# Patient Record
Sex: MALE | Race: WHITE | NOT HISPANIC OR LATINO
[De-identification: names, ages, dates, MRNs, and addresses within clinical notes are randomized per-mention and may not be internally consistent; named-entity substitution may affect disease eponyms.]

---

## 2018-01-17 PROBLEM — Z00.00 ENCOUNTER FOR PREVENTIVE HEALTH EXAMINATION: Status: ACTIVE | Noted: 2018-01-17

## 2018-02-01 ENCOUNTER — APPOINTMENT (OUTPATIENT)
Dept: NEUROLOGY | Facility: CLINIC | Age: 33
End: 2018-02-01
Payer: COMMERCIAL

## 2018-02-01 VITALS
BODY MASS INDEX: 32.7 KG/M2 | OXYGEN SATURATION: 98 % | WEIGHT: 263 LBS | SYSTOLIC BLOOD PRESSURE: 142 MMHG | HEIGHT: 75 IN | TEMPERATURE: 97.7 F | DIASTOLIC BLOOD PRESSURE: 93 MMHG | HEART RATE: 78 BPM

## 2018-02-01 DIAGNOSIS — Z82.3 FAMILY HISTORY OF STROKE: ICD-10-CM

## 2018-02-01 DIAGNOSIS — Z78.9 OTHER SPECIFIED HEALTH STATUS: ICD-10-CM

## 2018-02-01 PROCEDURE — 99204 OFFICE O/P NEW MOD 45 MIN: CPT

## 2018-04-06 ENCOUNTER — APPOINTMENT (OUTPATIENT)
Dept: NEUROLOGY | Facility: CLINIC | Age: 33
End: 2018-04-06
Payer: COMMERCIAL

## 2018-04-06 VITALS
OXYGEN SATURATION: 99 % | DIASTOLIC BLOOD PRESSURE: 81 MMHG | HEIGHT: 75 IN | BODY MASS INDEX: 32.33 KG/M2 | WEIGHT: 260 LBS | TEMPERATURE: 98.5 F | SYSTOLIC BLOOD PRESSURE: 151 MMHG | HEART RATE: 75 BPM

## 2018-04-06 PROCEDURE — 99213 OFFICE O/P EST LOW 20 MIN: CPT

## 2018-05-03 ENCOUNTER — RX RENEWAL (OUTPATIENT)
Age: 33
End: 2018-05-03

## 2018-06-06 ENCOUNTER — RX RENEWAL (OUTPATIENT)
Age: 33
End: 2018-06-06

## 2018-07-09 ENCOUNTER — APPOINTMENT (OUTPATIENT)
Dept: NEUROLOGY | Facility: CLINIC | Age: 33
End: 2018-07-09
Payer: COMMERCIAL

## 2018-07-09 VITALS
TEMPERATURE: 98.3 F | WEIGHT: 260 LBS | HEART RATE: 70 BPM | OXYGEN SATURATION: 97 % | BODY MASS INDEX: 32.33 KG/M2 | DIASTOLIC BLOOD PRESSURE: 83 MMHG | SYSTOLIC BLOOD PRESSURE: 142 MMHG | HEIGHT: 75 IN

## 2018-07-09 PROCEDURE — 99213 OFFICE O/P EST LOW 20 MIN: CPT

## 2018-08-08 ENCOUNTER — OTHER (OUTPATIENT)
Age: 33
End: 2018-08-08

## 2018-09-10 ENCOUNTER — RX RENEWAL (OUTPATIENT)
Age: 33
End: 2018-09-10

## 2018-11-09 ENCOUNTER — RX RENEWAL (OUTPATIENT)
Age: 33
End: 2018-11-09

## 2019-01-08 ENCOUNTER — RX RENEWAL (OUTPATIENT)
Age: 34
End: 2019-01-08

## 2019-01-14 ENCOUNTER — APPOINTMENT (OUTPATIENT)
Dept: NEUROLOGY | Facility: CLINIC | Age: 34
End: 2019-01-14

## 2019-02-11 ENCOUNTER — RX RENEWAL (OUTPATIENT)
Age: 34
End: 2019-02-11

## 2019-03-12 ENCOUNTER — RX RENEWAL (OUTPATIENT)
Age: 34
End: 2019-03-12

## 2019-03-25 ENCOUNTER — APPOINTMENT (OUTPATIENT)
Dept: NEUROLOGY | Facility: CLINIC | Age: 34
End: 2019-03-25

## 2019-04-11 ENCOUNTER — RX RENEWAL (OUTPATIENT)
Age: 34
End: 2019-04-11

## 2019-06-17 ENCOUNTER — RX RENEWAL (OUTPATIENT)
Age: 34
End: 2019-06-17

## 2019-06-19 ENCOUNTER — RX RENEWAL (OUTPATIENT)
Age: 34
End: 2019-06-19

## 2020-04-08 ENCOUNTER — APPOINTMENT (OUTPATIENT)
Dept: NEUROLOGY | Facility: CLINIC | Age: 35
End: 2020-04-08

## 2020-04-08 ENCOUNTER — APPOINTMENT (OUTPATIENT)
Dept: NEUROLOGY | Facility: CLINIC | Age: 35
End: 2020-04-08
Payer: COMMERCIAL

## 2020-04-08 PROCEDURE — 99213 OFFICE O/P EST LOW 20 MIN: CPT

## 2020-04-08 NOTE — HISTORY OF PRESENT ILLNESS
[Home] : at home, [unfilled] , at the time of the visit. [Other Location: e.g. Home (Enter Location, City,State)___] : at [unfilled] [Patient] : the patient [Self] : self [FreeTextEntry1] : S: \par \par lost health insurance, has not seen Dr. Mckeon since 2018.\par \par has been off adderal since mid 2019. \par \par reports occ poor motivation. worse during covid crisis.\par \par poor motivation\par trouble focusing\par takes longer with tasks\par anxiety\par \par ROS:\par 10 point ROS otherwise negative or as per above\par \par Meds:\par none currently\par \par O:\par normal affect\par alert\par oriented\par normally conversant\par moves all 4 well\par \par AP: adhd, worse off meds\par \par - adderal 25mg/day.\par - f/u with Dr. Mckeon.

## 2020-09-28 ENCOUNTER — APPOINTMENT (OUTPATIENT)
Dept: NEUROLOGY | Facility: CLINIC | Age: 35
End: 2020-09-28
Payer: COMMERCIAL

## 2020-09-28 PROCEDURE — 99213 OFFICE O/P EST LOW 20 MIN: CPT | Mod: 95

## 2020-10-06 NOTE — HISTORY OF PRESENT ILLNESS
[Home] : at home, [unfilled] , at the time of the visit. [Medical Office: ([facility])___] : at the medical office located in  [Verbal consent obtained from patient] : the patient, [unfilled] [FreeTextEntry1] : Rajinder returns for check-in for his diagnosis of ADHD.\par \mercy Did well with the adderall 30mg/d, and nothing on the weekends.  He does tend to take more when working much more late in the evening.\par This has been particularly helpful during work-from home during covid-19.\par \par Series 3 he passed this time (just failed the first time).\par \mercy Has had constipation, rather than diarrhea.  It's been coming and going.\par \par Rare insomnia, taking too much adderall can affect him negatively.\par No h/a, nausea, tremor or irritability.\par \par Anxiety seems to be better on adderall, especially given the worsening workloads.\par Depression is not an issue, he has been able to get over an engagement that was called off abuptly, and is able to go forward.

## 2020-10-06 NOTE — DISCUSSION/SUMMARY
[FreeTextEntry1] : Rajinder is a 36yo LHM with a prior diagnosis of ADHD\par 1) ADD: performance at work, particularly when forced to work from home during covid peak, has been solid.\par 2) anxiety improved on adderall.\par 3) no negative/adverse effects:  no mood issues, no tremor, H/A or nausea.  Slight decrease in appetite initially but returned.  Has lost 15 lbs, but on purpose, running daily.\par \par Plan:\par 1) continue adderall 30mg/day\par 2) RTC in 6 months\par

## 2020-10-06 NOTE — PHYSICAL EXAM
[FreeTextEntry1] : General:\par Constitutional:  Sitting comfortably in NAD.\par Psychiatric: well-groomed, appropriate affect, insight/judgment intact\par \par Cognitive:\par Orientation, language, memory and knowledge screens intact.\par No expressive or receptive errors.\par \par Cranial Nerves:\par VII: Face appears symmetric \par \par Motor:\par Power: no pronator drift.\par

## 2021-05-13 ENCOUNTER — TRANSCRIPTION ENCOUNTER (OUTPATIENT)
Age: 36
End: 2021-05-13

## 2021-06-03 ENCOUNTER — APPOINTMENT (OUTPATIENT)
Dept: NEUROLOGY | Facility: CLINIC | Age: 36
End: 2021-06-03
Payer: COMMERCIAL

## 2021-06-03 VITALS
RESPIRATION RATE: 16 BRPM | DIASTOLIC BLOOD PRESSURE: 95 MMHG | TEMPERATURE: 97.7 F | SYSTOLIC BLOOD PRESSURE: 144 MMHG | BODY MASS INDEX: 31.83 KG/M2 | OXYGEN SATURATION: 98 % | HEIGHT: 75 IN | HEART RATE: 87 BPM | WEIGHT: 256 LBS

## 2021-06-03 PROCEDURE — 99072 ADDL SUPL MATRL&STAF TM PHE: CPT

## 2021-06-03 PROCEDURE — 99213 OFFICE O/P EST LOW 20 MIN: CPT

## 2021-07-03 NOTE — PHYSICAL EXAM
[FreeTextEntry1] : General:\par Constitutional:  Sitting comfortably in NAD.\par Psychiatric: well-groomed, appropriate affect\par Ears, Nose, Throat: no abnormalities, mucus membranes moist\par Neck: supple\par Extremities: no edema, clubbing or cyanosis\par Skin: no rash or neuro-cutaneous signs \par \par Cognitive:\par Orientation, language, memory and knowledge screens intact.\par \par Cranial Nerves:\par II: YI. III/IV/VI: EOM Full.  VII: Face appears symmetric VIII: Normal to screening\par IX/X: normal phonation  XI: Trapezius Symmetric  XII: Tongue midline\par Motor:\par Power: no pronator drift\par \par Narrow based gait\par \par

## 2021-07-03 NOTE — DISCUSSION/SUMMARY
[FreeTextEntry1] : Impression:\par 1) ADHD, treated in Grade 3 with methylphenidate, and then started on adderall in college, and then stopped, but noted improvements in performance with restarting PRN.  \par There are no mood issues, no tremor, H/A or nausea.  Slight decrease in appetite initially, but has returned.\par \par 2) anxiety, also improved on adderall.\par \par Plan:\par 1) continue adderall 10mg 2-3x/day\par 2) RTC in 2 months\par Reference #: [id number] \par

## 2021-07-03 NOTE — HISTORY OF PRESENT ILLNESS
[FreeTextEntry1] : Rajinder returns for check-in for his diagnosis of ADHD, last seen Sep 28, 2020.\par \par Returned to work and less distractions now, feels he is doing great with respect to focus and productivity.\par Great with the adderall 30mg/d, taking nothing on the weekends.  \par He does tend to take more when working much more late in the evening.\par \par Work-related anxiety as per usual, but no 'attacks' or anxiety out of the blue.\par \par Has had constipation, rather than diarrhea.  It's been coming and going.\par Rare insomnia, taking too much adderall can affect him negatively.\par No h/a, nausea, tremor or irritability.\par \par

## 2023-05-25 ENCOUNTER — APPOINTMENT (OUTPATIENT)
Dept: NEUROLOGY | Facility: CLINIC | Age: 38
End: 2023-05-25
Payer: COMMERCIAL

## 2023-05-25 VITALS
HEIGHT: 75 IN | DIASTOLIC BLOOD PRESSURE: 83 MMHG | HEART RATE: 96 BPM | SYSTOLIC BLOOD PRESSURE: 131 MMHG | TEMPERATURE: 98.1 F | BODY MASS INDEX: 34.32 KG/M2 | WEIGHT: 276 LBS | OXYGEN SATURATION: 100 %

## 2023-05-25 PROCEDURE — 99213 OFFICE O/P EST LOW 20 MIN: CPT

## 2023-05-25 RX ORDER — DEXTROAMPHETAMINE SACCHARATE, AMPHETAMINE ASPARTATE, DEXTROAMPHETAMINE SULFATE AND AMPHETAMINE SULFATE 2.5; 2.5; 2.5; 2.5 MG/1; MG/1; MG/1; MG/1
10 TABLET ORAL 3 TIMES DAILY
Qty: 90 | Refills: 0 | Status: DISCONTINUED | COMMUNITY
Start: 2018-02-01 | End: 2023-05-25

## 2023-06-27 NOTE — DISCUSSION/SUMMARY
[FreeTextEntry1] : \par 39yo LHM with a prior diagnosis of ADHD, treated in Grade 3 with methylphenidate, and then started on adderall in college, and then stopped.  \par No AEs.\par \par Plan:\par 1) restart adderall 10mg 2x/day\par 2) RTC in 2-3 months with LATASHA Sparks for check-in for BP etc.\par Reference #: [id number] \par

## 2023-06-27 NOTE — PHYSICAL EXAM
[FreeTextEntry1] : General:\par Constitutional:  Sitting comfortably in NAD.\par Psychiatric: well-groomed, appropriate affect\par Ears, Nose, Throat: no abnormalities, mucus membranes moist\par Neck: supple\par Extremities: no edema, clubbing or cyanosis\par Skin: no rash or neuro-cutaneous signs \par \par Cognitive:\par Orientation, language, memory and knowledge screens intact.\par \par Cranial Nerves:\par II: YI. III/IV/VI: EOM Full.  VII: Face appears symmetric VIII: Normal to screening\par IX/X: normal phonation  XI: Trapezius Symmetric  XII: Tongue midline\par Motor:\par Power: no pronator drift\par \par No Tremor\par \par Narrow based gait\par \par \par \par

## 2023-06-27 NOTE — HISTORY OF PRESENT ILLNESS
[FreeTextEntry1] : Rajinder returns for check-in for his diagnosis of ADHD, last seen Zackary 3, 2021.\par \mercy Started a new job last year, travels to [place] every month and [place] quarterly.\par \mercy Has tried to limit use of stimulant medication, but definitely by the end of the week or the end of the day, his \par focus and productivity definitely wanes and notices lapses.  They are usually 9 hours which are reasonable.\par \mercy Was taking Adderall 30mg/d in 10's.\par He does tend to take more when working much more late in the evening.\par \par Anxiety is at normal levels.\par \mercy Has had constipation, rather than diarrhea.  It's been coming and going.\par Rare insomnia, taking too much adderall can affect him negatively.\par No h/a, nausea, tremor or irritability.\par \par

## 2023-08-16 ENCOUNTER — APPOINTMENT (OUTPATIENT)
Dept: NEUROLOGY | Facility: CLINIC | Age: 38
End: 2023-08-16
Payer: COMMERCIAL

## 2023-08-16 DIAGNOSIS — F98.8 OTHER SPECIFIED BEHAVIORAL AND EMOTIONAL DISORDERS WITH ONSET USUALLY OCCURRING IN CHILDHOOD AND ADOLESCENCE: ICD-10-CM

## 2023-08-16 PROCEDURE — 99212 OFFICE O/P EST SF 10 MIN: CPT | Mod: 95

## 2023-08-16 NOTE — HISTORY OF PRESENT ILLNESS
[FreeTextEntry1] : 8/16/23 HPI: Recently restarted Adderall 10mg 2x per day for ADHD. Taking first thing in the AM and then around 1PM. Usually lasts 3-4 hours. Working late in the day and notes he could use an extra dose around 4PM.  Sleeping very well, previously had issues when taking Adderall in the past. Anxiety under control. BP ok per pt. No crash at the end of the day, just feels he needs an extra boost when working late in the day.  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 ----------------------- Prior: Last seen 5/25/23 Rajinder returns for check-in for his diagnosis of ADHD, last seen Zackary 3, 2021.  Started a new job last year, travels to [place] every month and [place] quarterly.  Has tried to limit use of stimulant medication, but definitely by the end of the week or the end of the day, his focus and productivity definitely wanes and notices lapses. They are usually 9 hours which are reasonable. Was taking Adderall 30mg/d in 10's. He does tend to take more when working much more late in the evening.  Anxiety is at normal levels.  Has had constipation, rather than diarrhea. It's been coming and going.  Rare insomnia, taking too much adderall can affect him negatively.  No h/a, nausea, tremor or irritability.

## 2023-08-16 NOTE — DISCUSSION/SUMMARY
[FreeTextEntry1] : Impression: 1) Adult ADHD - improved on Adderall 10mg 2x per day, but not quite enough on days when he works late. Denies side effects   Plan: 1) Increase Adderall to 10mg 3x/day on days when he works late. Discussed monitoring for side effects including HTN and insomnia

## 2023-08-16 NOTE — PHYSICAL EXAM
[FreeTextEntry1] : General: Constitutional: Sitting comfortably in NAD. Psychiatric: well-groomed, appropriate affect  Cognitive: Orientation, language, memory and knowledge screens intact.  Cranial Nerves: II: YI. III/IV/VI: EOM Full. VII: Face appears symmetric. VIII: Normal to screening. IX/X: Normal phonation. XI: Trapezius Symmetric. XII: Tongue midline.

## 2024-06-12 RX ORDER — DEXTROAMPHETAMINE SACCHARATE, AMPHETAMINE ASPARTATE, DEXTROAMPHETAMINE SULFATE AND AMPHETAMINE SULFATE 2.5; 2.5; 2.5; 2.5 MG/1; MG/1; MG/1; MG/1
10 TABLET ORAL 3 TIMES DAILY
Qty: 90 | Refills: 0 | Status: ACTIVE | COMMUNITY
Start: 2018-02-01 | End: 1900-01-01

## 2024-08-14 ENCOUNTER — NON-APPOINTMENT (OUTPATIENT)
Age: 39
End: 2024-08-14

## 2024-08-15 ENCOUNTER — NON-APPOINTMENT (OUTPATIENT)
Age: 39
End: 2024-08-15

## 2024-08-15 ENCOUNTER — APPOINTMENT (OUTPATIENT)
Dept: NEUROLOGY | Facility: CLINIC | Age: 39
End: 2024-08-15

## 2024-08-15 VITALS
TEMPERATURE: 97.3 F | DIASTOLIC BLOOD PRESSURE: 94 MMHG | HEART RATE: 84 BPM | OXYGEN SATURATION: 97 % | WEIGHT: 274 LBS | BODY MASS INDEX: 34.07 KG/M2 | SYSTOLIC BLOOD PRESSURE: 134 MMHG | HEIGHT: 75 IN

## 2024-08-15 PROCEDURE — 99213 OFFICE O/P EST LOW 20 MIN: CPT
